# Patient Record
Sex: MALE | Race: WHITE | Employment: UNEMPLOYED | URBAN - METROPOLITAN AREA
[De-identification: names, ages, dates, MRNs, and addresses within clinical notes are randomized per-mention and may not be internally consistent; named-entity substitution may affect disease eponyms.]

---

## 2023-05-28 ENCOUNTER — HOSPITAL ENCOUNTER (EMERGENCY)
Age: 28
Discharge: HOME | End: 2023-05-28
Attending: EMERGENCY MEDICINE

## 2023-05-28 VITALS
OXYGEN SATURATION: 95 % | HEIGHT: 71 IN | DIASTOLIC BLOOD PRESSURE: 103 MMHG | WEIGHT: 213.63 LBS | RESPIRATION RATE: 16 BRPM | HEART RATE: 97 BPM | BODY MASS INDEX: 29.91 KG/M2 | SYSTOLIC BLOOD PRESSURE: 141 MMHG | TEMPERATURE: 98 F

## 2023-05-28 DIAGNOSIS — L30.9 DERMATITIS: Primary | ICD-10-CM

## 2023-05-28 PROCEDURE — 99283 EMERGENCY DEPT VISIT LOW MDM: CPT

## 2023-05-28 RX ORDER — METHYLPREDNISOLONE 4 MG/1
TABLET ORAL
Qty: 1 KIT | Refills: 0 | Status: SHIPPED | OUTPATIENT
Start: 2023-05-28 | End: 2023-06-03

## 2023-05-28 ASSESSMENT — PAIN DESCRIPTION - LOCATION: LOCATION: EYE

## 2023-05-28 ASSESSMENT — PAIN - FUNCTIONAL ASSESSMENT: PAIN_FUNCTIONAL_ASSESSMENT: 0-10

## 2023-05-28 ASSESSMENT — PAIN DESCRIPTION - ORIENTATION: ORIENTATION: RIGHT;LEFT

## 2023-05-28 ASSESSMENT — PAIN DESCRIPTION - DESCRIPTORS: DESCRIPTORS: BURNING

## 2023-05-28 ASSESSMENT — PAIN SCALES - GENERAL: PAINLEVEL_OUTOF10: 5

## 2023-05-28 ASSESSMENT — LIFESTYLE VARIABLES: HOW OFTEN DO YOU HAVE A DRINK CONTAINING ALCOHOL: NEVER

## 2023-09-18 ENCOUNTER — HOSPITAL ENCOUNTER (EMERGENCY)
Age: 28
Discharge: HOME | End: 2023-09-18
Attending: EMERGENCY MEDICINE
Payer: COMMERCIAL

## 2023-09-18 VITALS
BODY MASS INDEX: 30.48 KG/M2 | OXYGEN SATURATION: 99 % | RESPIRATION RATE: 16 BRPM | WEIGHT: 225 LBS | HEART RATE: 90 BPM | SYSTOLIC BLOOD PRESSURE: 150 MMHG | HEIGHT: 72 IN | DIASTOLIC BLOOD PRESSURE: 100 MMHG | TEMPERATURE: 97.6 F

## 2023-09-18 DIAGNOSIS — S46.911A STRAIN OF RIGHT SHOULDER, INITIAL ENCOUNTER: Primary | ICD-10-CM

## 2023-09-18 PROCEDURE — 99283 EMERGENCY DEPT VISIT LOW MDM: CPT

## 2023-09-18 PROCEDURE — 6370000000 HC RX 637 (ALT 250 FOR IP): Performed by: EMERGENCY MEDICINE

## 2023-09-18 RX ORDER — OXYCODONE HYDROCHLORIDE AND ACETAMINOPHEN 5; 325 MG/1; MG/1
1 TABLET ORAL EVERY 6 HOURS PRN
Qty: 8 TABLET | Refills: 0 | Status: SHIPPED | OUTPATIENT
Start: 2023-09-18 | End: 2023-09-21

## 2023-09-18 RX ORDER — CYCLOBENZAPRINE HCL 10 MG
10 TABLET ORAL ONCE
Status: COMPLETED | OUTPATIENT
Start: 2023-09-18 | End: 2023-09-18

## 2023-09-18 RX ORDER — LIDOCAINE 4 G/G
1 PATCH TOPICAL DAILY
Qty: 10 EACH | Refills: 0 | Status: SHIPPED | OUTPATIENT
Start: 2023-09-18 | End: 2023-09-28

## 2023-09-18 RX ORDER — OXYCODONE HYDROCHLORIDE AND ACETAMINOPHEN 5; 325 MG/1; MG/1
1 TABLET ORAL ONCE
Status: COMPLETED | OUTPATIENT
Start: 2023-09-18 | End: 2023-09-18

## 2023-09-18 RX ORDER — CYCLOBENZAPRINE HCL 5 MG
5 TABLET ORAL 3 TIMES DAILY PRN
Qty: 15 TABLET | Refills: 0 | Status: SHIPPED | OUTPATIENT
Start: 2023-09-18 | End: 2023-09-28

## 2023-09-18 RX ADMIN — OXYCODONE AND ACETAMINOPHEN 1 TABLET: 5; 325 TABLET ORAL at 08:26

## 2023-09-18 RX ADMIN — CYCLOBENZAPRINE 10 MG: 10 TABLET, FILM COATED ORAL at 08:26

## 2023-09-18 ASSESSMENT — PAIN SCALES - GENERAL
PAINLEVEL_OUTOF10: 7
PAINLEVEL_OUTOF10: 7

## 2023-09-18 ASSESSMENT — LIFESTYLE VARIABLES
HOW MANY STANDARD DRINKS CONTAINING ALCOHOL DO YOU HAVE ON A TYPICAL DAY: 1 OR 2
HOW OFTEN DO YOU HAVE A DRINK CONTAINING ALCOHOL: 2-4 TIMES A MONTH

## 2023-09-18 ASSESSMENT — PAIN DESCRIPTION - ORIENTATION
ORIENTATION: RIGHT
ORIENTATION: RIGHT

## 2023-09-18 ASSESSMENT — PAIN - FUNCTIONAL ASSESSMENT
PAIN_FUNCTIONAL_ASSESSMENT: 0-10
PAIN_FUNCTIONAL_ASSESSMENT: 0-10

## 2023-09-18 ASSESSMENT — PAIN DESCRIPTION - LOCATION
LOCATION: SHOULDER
LOCATION: SHOULDER

## 2023-09-18 ASSESSMENT — PAIN DESCRIPTION - DESCRIPTORS: DESCRIPTORS: SHARP

## 2023-09-18 NOTE — ED TRIAGE NOTES
Pt started having right shoulder pain for a week, took advil but still in pain, had his socket popped out from the same shoulder 9 yrs ago.

## 2024-07-12 ENCOUNTER — HOSPITAL ENCOUNTER (EMERGENCY)
Age: 29
Discharge: HOME | End: 2024-07-12

## 2024-07-12 ENCOUNTER — APPOINTMENT (OUTPATIENT)
Dept: GENERAL RADIOLOGY | Age: 29
End: 2024-07-12

## 2024-07-12 VITALS
RESPIRATION RATE: 16 BRPM | WEIGHT: 224.4 LBS | HEART RATE: 75 BPM | TEMPERATURE: 98.4 F | OXYGEN SATURATION: 99 % | HEIGHT: 72 IN | BODY MASS INDEX: 30.4 KG/M2 | SYSTOLIC BLOOD PRESSURE: 137 MMHG | DIASTOLIC BLOOD PRESSURE: 105 MMHG

## 2024-07-12 DIAGNOSIS — M25.562 LEFT KNEE PAIN, UNSPECIFIED CHRONICITY: Primary | ICD-10-CM

## 2024-07-12 PROCEDURE — 99283 EMERGENCY DEPT VISIT LOW MDM: CPT

## 2024-07-12 PROCEDURE — 73560 X-RAY EXAM OF KNEE 1 OR 2: CPT

## 2024-07-12 ASSESSMENT — PAIN DESCRIPTION - DESCRIPTORS: DESCRIPTORS: ACHING

## 2024-07-12 ASSESSMENT — ENCOUNTER SYMPTOMS
DIARRHEA: 0
COLOR CHANGE: 0
BACK PAIN: 0
FACIAL SWELLING: 0
SHORTNESS OF BREATH: 0
NAUSEA: 0
VOMITING: 0
COUGH: 0
ABDOMINAL PAIN: 0

## 2024-07-12 ASSESSMENT — PAIN SCALES - GENERAL: PAINLEVEL_OUTOF10: 5

## 2024-07-12 ASSESSMENT — PAIN - FUNCTIONAL ASSESSMENT: PAIN_FUNCTIONAL_ASSESSMENT: 0-10

## 2024-07-12 ASSESSMENT — PAIN DESCRIPTION - PAIN TYPE: TYPE: ACUTE PAIN

## 2024-07-12 ASSESSMENT — PAIN DESCRIPTION - LOCATION: LOCATION: KNEE

## 2024-07-12 ASSESSMENT — PAIN DESCRIPTION - FREQUENCY: FREQUENCY: CONTINUOUS

## 2024-07-12 ASSESSMENT — PAIN DESCRIPTION - ORIENTATION: ORIENTATION: LEFT

## 2024-07-12 NOTE — ED PROVIDER NOTES
THE [facility]  EMERGENCY DEPARTMENT ENCOUNTER          PHYSICIAN ASSISTANT NOTE       Date of evaluation: 7/12/2024    Chief Complaint     Knee Pain (Knee feels like its popping out of place when walking/turning )      History of Present Illness     Sumanth Collins is a 29 y.o. male who presents with complaints of pain to the left knee.  Patient states has had problems with his left knee intermittently since he was younger.  He states today he Is feeling popping to the left knee.  He has been able to bear weight on the left lower extremity.  Denies any radiation of pain, paresthesia or weakness left lower extremity.  Denies any swelling of the knee, erythema warmth to touch.  Denies any decreased range of motion.  Denies any fall or direct injury or trauma to the knee.  He declines anything for pain now.    ASSESSMENT / PLAN  (MEDICAL DECISION MAKING)     INITIAL VITALS: BP: (!) 137/105, Temp: 98.4 °F (36.9 °C), Pulse: 75, Respirations: 16, SpO2: 99 %    Sumanth Collins is a 29 y.o. male with history of Osgood-Schlatter presents to the emergency department with left knee pain and worsening popping to the left knee today.  He is neurovascular intact left lower extremity.  He is able to ambulate.  He has no signs of septic joint, no erythema, swelling or decreased range of motion of the left knee.  He had no direct trauma.  He declined anything for pain here.  Left knee x-ray shows no fracture.  Patient was given Ace wrap to wear with activity.  Rest ice elevate.  He is referred to orthopedics for follow-up.  If increased pain, numbness, swelling, redness, decreased range of motion or worsening pain or symptoms return the emergency department.  Patient stable for discharge.  Alternate Tylenol and ibuprofen for pain.    Is this patient to be included in the SEP-1 core measure? No Exclusion criteria - the patient is NOT to be included for SEP-1 Core Measure due to: Infection is not suspected    Medical Decision

## 2024-07-12 NOTE — DISCHARGE INSTRUCTIONS
Rest, Ice 15 min every 4-6 hours, elevate  Wear ace wrap with activity  If increase pain, swelling, numbness, unable to walk or move the knee, or worse return to ED  Tylenol and ibuprofen for pain

## 2024-07-13 NOTE — ED NOTES
Pt being discharged. Went over discharge instructions and all questions answered. Pt ambulated out of the department with all belongings at bedside.        Barbi Ríos RN  07/12/24 2003

## 2025-02-12 ENCOUNTER — OFFICE VISIT (OUTPATIENT)
Dept: FAMILY MEDICINE CLINIC | Age: 30
End: 2025-02-12

## 2025-02-12 VITALS
SYSTOLIC BLOOD PRESSURE: 138 MMHG | WEIGHT: 223 LBS | TEMPERATURE: 98.5 F | OXYGEN SATURATION: 96 % | DIASTOLIC BLOOD PRESSURE: 84 MMHG | HEIGHT: 72 IN | BODY MASS INDEX: 30.2 KG/M2 | HEART RATE: 77 BPM

## 2025-02-12 DIAGNOSIS — Z00.00 ROUTINE GENERAL MEDICAL EXAMINATION AT A HEALTH CARE FACILITY: Primary | ICD-10-CM

## 2025-02-12 DIAGNOSIS — F41.1 GAD (GENERALIZED ANXIETY DISORDER): ICD-10-CM

## 2025-02-12 DIAGNOSIS — Z82.49 FAMILY HISTORY OF CARDIAC DISORDER IN FATHER: ICD-10-CM

## 2025-02-12 DIAGNOSIS — F32.1 CURRENT MODERATE EPISODE OF MAJOR DEPRESSIVE DISORDER WITHOUT PRIOR EPISODE (HCC): ICD-10-CM

## 2025-02-12 DIAGNOSIS — Z98.52 VASECTOMY STATUS: ICD-10-CM

## 2025-02-12 RX ORDER — HYDROXYZINE HYDROCHLORIDE 25 MG/1
25 TABLET, FILM COATED ORAL EVERY 8 HOURS PRN
Qty: 30 TABLET | Refills: 2 | Status: SHIPPED | OUTPATIENT
Start: 2025-02-12 | End: 2025-03-14

## 2025-02-12 RX ORDER — ONDANSETRON 4 MG/1
4 TABLET, ORALLY DISINTEGRATING ORAL EVERY 8 HOURS PRN
COMMUNITY
Start: 2025-01-21 | End: 2025-02-12

## 2025-02-12 SDOH — ECONOMIC STABILITY: FOOD INSECURITY: WITHIN THE PAST 12 MONTHS, THE FOOD YOU BOUGHT JUST DIDN'T LAST AND YOU DIDN'T HAVE MONEY TO GET MORE.: SOMETIMES TRUE

## 2025-02-12 SDOH — ECONOMIC STABILITY: FOOD INSECURITY: WITHIN THE PAST 12 MONTHS, YOU WORRIED THAT YOUR FOOD WOULD RUN OUT BEFORE YOU GOT MONEY TO BUY MORE.: SOMETIMES TRUE

## 2025-02-12 ASSESSMENT — COLUMBIA-SUICIDE SEVERITY RATING SCALE - C-SSRS
1. WITHIN THE PAST MONTH, HAVE YOU WISHED YOU WERE DEAD OR WISHED YOU COULD GO TO SLEEP AND NOT WAKE UP?: NO
6. HAVE YOU EVER DONE ANYTHING, STARTED TO DO ANYTHING, OR PREPARED TO DO ANYTHING TO END YOUR LIFE?: NO
2. HAVE YOU ACTUALLY HAD ANY THOUGHTS OF KILLING YOURSELF?: NO

## 2025-02-12 ASSESSMENT — PATIENT HEALTH QUESTIONNAIRE - PHQ9
DEPRESSION UNABLE TO ASSESS: FUNCTIONAL CAPACITY MOTIVATION LIMITS ACCURACY
SUM OF ALL RESPONSES TO PHQ QUESTIONS 1-9: 21
2. FEELING DOWN, DEPRESSED OR HOPELESS: MORE THAN HALF THE DAYS
SUM OF ALL RESPONSES TO PHQ9 QUESTIONS 1 & 2: 5
SUM OF ALL RESPONSES TO PHQ QUESTIONS 1-9: 21
SUM OF ALL RESPONSES TO PHQ QUESTIONS 1-9: 21
4. FEELING TIRED OR HAVING LITTLE ENERGY: NEARLY EVERY DAY
9. THOUGHTS THAT YOU WOULD BE BETTER OFF DEAD, OR OF HURTING YOURSELF: NOT AT ALL
10. IF YOU CHECKED OFF ANY PROBLEMS, HOW DIFFICULT HAVE THESE PROBLEMS MADE IT FOR YOU TO DO YOUR WORK, TAKE CARE OF THINGS AT HOME, OR GET ALONG WITH OTHER PEOPLE: VERY DIFFICULT
SUM OF ALL RESPONSES TO PHQ QUESTIONS 1-9: 21
3. TROUBLE FALLING OR STAYING ASLEEP: NEARLY EVERY DAY
6. FEELING BAD ABOUT YOURSELF - OR THAT YOU ARE A FAILURE OR HAVE LET YOURSELF OR YOUR FAMILY DOWN: MORE THAN HALF THE DAYS
7. TROUBLE CONCENTRATING ON THINGS, SUCH AS READING THE NEWSPAPER OR WATCHING TELEVISION: NEARLY EVERY DAY
5. POOR APPETITE OR OVEREATING: MORE THAN HALF THE DAYS
8. MOVING OR SPEAKING SO SLOWLY THAT OTHER PEOPLE COULD HAVE NOTICED. OR THE OPPOSITE, BEING SO FIGETY OR RESTLESS THAT YOU HAVE BEEN MOVING AROUND A LOT MORE THAN USUAL: NEARLY EVERY DAY
1. LITTLE INTEREST OR PLEASURE IN DOING THINGS: NEARLY EVERY DAY

## 2025-02-12 ASSESSMENT — ANXIETY QUESTIONNAIRES
5. BEING SO RESTLESS THAT IT IS HARD TO SIT STILL: NEARLY EVERY DAY
4. TROUBLE RELAXING: MORE THAN HALF THE DAYS
3. WORRYING TOO MUCH ABOUT DIFFERENT THINGS: NEARLY EVERY DAY
2. NOT BEING ABLE TO STOP OR CONTROL WORRYING: NEARLY EVERY DAY
GAD7 TOTAL SCORE: 19
6. BECOMING EASILY ANNOYED OR IRRITABLE: NEARLY EVERY DAY
7. FEELING AFRAID AS IF SOMETHING AWFUL MIGHT HAPPEN: MORE THAN HALF THE DAYS
IF YOU CHECKED OFF ANY PROBLEMS ON THIS QUESTIONNAIRE, HOW DIFFICULT HAVE THESE PROBLEMS MADE IT FOR YOU TO DO YOUR WORK, TAKE CARE OF THINGS AT HOME, OR GET ALONG WITH OTHER PEOPLE: VERY DIFFICULT
1. FEELING NERVOUS, ANXIOUS, OR ON EDGE: NEARLY EVERY DAY

## 2025-02-12 ASSESSMENT — ENCOUNTER SYMPTOMS
CONSTIPATION: 0
SHORTNESS OF BREATH: 0
DIARRHEA: 0
COUGH: 0
SINUS PAIN: 0
ABDOMINAL PAIN: 0
WHEEZING: 0
BACK PAIN: 0
SINUS PRESSURE: 0
COLOR CHANGE: 0

## 2025-02-12 NOTE — PROGRESS NOTES
Subjective:      Patient ID: Sumanth Collins is a 29 y.o. y.o. male.    HPI    Chief Complaint   Patient presents with    Establish Care    Mental Health Problem    Other     Reproductive systems       Patient is here to establish care.   Previous Care:         Past Medical History:   Diagnosis Date    Allergic rhinitis     Term    Left varicocele     Tobacco abuse        No past surgical history on file.    Social History     Socioeconomic History    Marital status: Single     Spouse name: Not on file    Number of children: Not on file    Years of education: Not on file    Highest education level: Not on file   Occupational History    Not on file   Tobacco Use    Smoking status: Every Day     Current packs/day: 1.00     Average packs/day: 1 pack/day for 12.1 years (12.1 ttl pk-yrs)     Types: Cigarettes     Start date: 2013    Smokeless tobacco: Former     Types: Chew     Quit date: 2020   Vaping Use    Vaping status: Every Day    Substances: Nicotine    Devices: Disposable   Substance and Sexual Activity    Alcohol use: Yes     Alcohol/week: 24.0 standard drinks of alcohol     Types: 24 Cans of beer per week    Drug use: Yes     Types: Marijuana (Weed)     Comment: twice a week    Sexual activity: Yes     Partners: Female   Other Topics Concern    Not on file   Social History Narrative    Not on file     Social Determinants of Health     Financial Resource Strain: Not on file   Food Insecurity: Unknown (1/22/2024)    [facility], [facility] and [facility]    Food Insecurities     Worried about running out of food: Not on file     Food Bought: Not on file   Transportation Needs: Unknown (1/22/2024)    Received from [facility] and [facility], [facility] and [facility]    Transportation     Worried about transportation: Not on file   Physical Activity: Not on file   Stress: Not on file   Social Connections: Not on file   Intimate 
Normocephalic and atraumatic.   Eyes:      Extraocular Movements: Extraocular movements intact.   Pulmonary:      Effort: Pulmonary effort is normal.   Musculoskeletal:      Cervical back: Normal range of motion.   Skin:     Coloration: Skin is not jaundiced or pale.   Neurological:      General: No focal deficit present.      Mental Status: He is alert and oriented to person, place, and time.   Psychiatric:         Mood and Affect: Mood normal.         Behavior: Behavior normal.         Thought Content: Thought content normal.           An electronic signature was used to authenticate this note.    --Kellie Quiroga, ADE - CNP

## 2025-02-12 NOTE — PATIENT INSTRUCTIONS
Pt declines SDOH resources.  Behavioral Health (Counseling) Referrals      [facility]  [email or web address].[email or web address]  [phone]     [facility]  [phone]  [email or web address]    [facility]   [phone]  [email or web address]    [facility] [place]   [email or web address]  [phone]    [facility]   [phone]  [email or web address]     [facility]  [address] [place] [address]  [phone]  [email or web address]    [facility]   [phone]  [email or web address]    Psychologytoday.[email or web address]   - This is an online tool to help find providers that are covered by your insurance and treat the topic you are seeking help for.

## 2025-04-02 ENCOUNTER — OFFICE VISIT (OUTPATIENT)
Dept: FAMILY MEDICINE CLINIC | Age: 30
End: 2025-04-02
Payer: COMMERCIAL

## 2025-04-02 VITALS
HEIGHT: 72 IN | HEART RATE: 73 BPM | TEMPERATURE: 97.5 F | SYSTOLIC BLOOD PRESSURE: 124 MMHG | OXYGEN SATURATION: 96 % | DIASTOLIC BLOOD PRESSURE: 80 MMHG | BODY MASS INDEX: 29.93 KG/M2 | WEIGHT: 221 LBS

## 2025-04-02 DIAGNOSIS — F17.200 SMOKER: Primary | ICD-10-CM

## 2025-04-02 DIAGNOSIS — F33.1 MODERATE EPISODE OF RECURRENT MAJOR DEPRESSIVE DISORDER (HCC): ICD-10-CM

## 2025-04-02 PROCEDURE — G2211 COMPLEX E/M VISIT ADD ON: HCPCS | Performed by: NURSE PRACTITIONER

## 2025-04-02 PROCEDURE — 99214 OFFICE O/P EST MOD 30 MIN: CPT | Performed by: NURSE PRACTITIONER

## 2025-04-02 ASSESSMENT — ENCOUNTER SYMPTOMS
WHEEZING: 0
CONSTIPATION: 0
ABDOMINAL PAIN: 0
BACK PAIN: 0
SHORTNESS OF BREATH: 0
SINUS PRESSURE: 0
COUGH: 0
DIARRHEA: 0
SINUS PAIN: 0
COLOR CHANGE: 0

## 2025-04-02 NOTE — PROGRESS NOTES
Sumanth Collins (:  1995) is a 29 y.o. male,Established patient, here for evaluation of the following chief complaint(s):  Establish Care         Assessment & Plan  1. Anxiety.  - Reports that sertraline (Zoloft) is working well without significant side effects, except for a brief period of zoning out during the first hour after taking it.  - Blood pressure has improved to 124/80, indicating reduced stress and anxiety.  - Advised that the groggy feeling should improve over time; hydroxyzine has been effective for moments of heightened anxiety and will be used as needed to prevent tolerance buildup.  - Continue current dosage of sertraline; patient still has one more refill from the 90-day supply and will be contacted with the results of his blood work.    2. Family history of heart disease.  - Father had heart and artery disease and passed away at 55.  - Advised to reduce smoking due to its significant risk factor for heart disease.  - Instructed to complete the previously ordered labs, including cholesterol levels, to assess his risk.  - Will be contacted with the results of his blood work.    3. Smoker   Chronic, not at goal (unstable), encouraged full cessation.        Follow-up  - The patient will follow up in 6 months.        No follow-ups on file.    SUBJECTIVE/OBJECTIVE:    History of Present Illness  The patient presents for a follow-up on Zoloft (sertraline) medication and to discuss his family history of heart disease.    A positive response to the medication is reported, with no significant mood fluctuations. However, a transient state of inattention occurs approximately 30 minutes post-administration, which resolves spontaneously. An improvement in blood pressure readings since starting sertraline is noted, with a recent reading of 124/80. Hydroxyzine is used as needed for anxiety, particularly in crowded environments, and is found beneficial. Hydroxyzine has been used approximately five times, with

## 2025-07-17 ENCOUNTER — TELEPHONE (OUTPATIENT)
Dept: FAMILY MEDICINE CLINIC | Age: 30
End: 2025-07-17

## 2025-07-17 NOTE — TELEPHONE ENCOUNTER
Requested Prescriptions     Pending Prescriptions Disp Refills    sertraline (ZOLOFT) 50 MG tablet 90 tablet 1     Sig: Take 1 tablet by mouth daily